# Patient Record
Sex: FEMALE | Race: WHITE | NOT HISPANIC OR LATINO | Employment: OTHER | URBAN - METROPOLITAN AREA
[De-identification: names, ages, dates, MRNs, and addresses within clinical notes are randomized per-mention and may not be internally consistent; named-entity substitution may affect disease eponyms.]

---

## 2020-01-24 NOTE — PATIENT DISCUSSION
1/24/20, PT WAS PRE OPED ON 1/22/20 FOR PPV , PT PRESENTED ON 1/23/20 TO OUT PATIENT  [facility] ON 1/23/20 HOWEVER ANESTHESIOLOGIST DID NOT CLEAR THE PATIENT AND FELT THAT SHOULD BE PERFORMED AT A TERTIARY CENTER. TO BP AT [facility].

## 2022-04-18 ENCOUNTER — NEW PATIENT (OUTPATIENT)
Dept: URBAN - METROPOLITAN AREA CLINIC 42 | Facility: CLINIC | Age: 52
End: 2022-04-18

## 2022-04-18 DIAGNOSIS — H52.4: ICD-10-CM

## 2022-04-18 DIAGNOSIS — H52.03: ICD-10-CM

## 2022-04-18 DIAGNOSIS — Z46.0: ICD-10-CM

## 2022-04-18 DIAGNOSIS — H52.223: ICD-10-CM

## 2022-04-18 PROCEDURE — 92004 COMPRE OPH EXAM NEW PT 1/>: CPT

## 2022-04-18 PROCEDURE — 92015 DETERMINE REFRACTIVE STATE: CPT

## 2022-04-18 PROCEDURE — 92310-2 LEVEL 2 CONTACT LENS MANAGEMENT

## 2022-04-18 ASSESSMENT — VISUAL ACUITY
OD_SC: 20/20-2
OD_SC: 20/100+2
OS_SC: 20/30-2
OS_SC: 20/100-2
OU_SC: 20/70
OU_SC: 20/20

## 2022-04-18 ASSESSMENT — KERATOMETRY
OS_K1POWER_DIOPTERS: 44.25
OD_K2POWER_DIOPTERS: 44.25
OS_AXISANGLE_DEGREES: 002
OD_AXISANGLE2_DEGREES: 051
OS_AXISANGLE2_DEGREES: 92
OD_AXISANGLE_DEGREES: 141
OS_K2POWER_DIOPTERS: 44.75
OD_K1POWER_DIOPTERS: 43.50

## 2022-04-18 ASSESSMENT — TONOMETRY
OD_IOP_MMHG: 17
OS_IOP_MMHG: 16